# Patient Record
Sex: FEMALE
[De-identification: names, ages, dates, MRNs, and addresses within clinical notes are randomized per-mention and may not be internally consistent; named-entity substitution may affect disease eponyms.]

---

## 2019-04-09 ENCOUNTER — HOSPITAL ENCOUNTER (EMERGENCY)
Dept: HOSPITAL 25 - UCEAST | Age: 24
Discharge: HOME | End: 2019-04-09
Payer: COMMERCIAL

## 2019-04-09 VITALS — DIASTOLIC BLOOD PRESSURE: 81 MMHG | SYSTOLIC BLOOD PRESSURE: 141 MMHG

## 2019-04-09 DIAGNOSIS — R21: Primary | ICD-10-CM

## 2019-04-09 PROCEDURE — 99202 OFFICE O/P NEW SF 15 MIN: CPT

## 2019-04-09 PROCEDURE — G0463 HOSPITAL OUTPT CLINIC VISIT: HCPCS

## 2019-04-09 NOTE — UC
Skin Complaint HPI





- HPI Summary


HPI Summary: 


23-year-old woman comes in with a chief complaint of a rash.  The rash started 

4 days ago on her chest while she was wearing a life vest while kayaking in 

[place].  The rashes greatly spread to her upper shoulders her neck or arms or 

legs.  She was seen at [facility] 2 days ago and started on Benadryl.  The 

Benadryl does not seem to be helping much.  The rash is very itchy.  Patient 

feels well otherwise no sore throat no fevers.





- History of Current Complaint


Chief Complaint: UCSkin


Time Seen by Provider: 04/09/19 19:35


Stated Complaint: RASH


Hx Last Menstrual Period: 4/9/2019


Pain Intensity: 0





- Allergy/Home Medications


Allergies/Adverse Reactions: 


 Allergies











Allergy/AdvReac Type Severity Reaction Status Date / Time


 


No Known Allergies Allergy   Verified 04/09/19 19:48














PMH/Surg Hx/FS Hx/Imm Hx


Previously Healthy: Yes





- Surgical History


Surgical History: None





- Family History


Known Family History: Positive: Non-Contributory





- Social History


Alcohol Use: None


Substance Use Type: None


Smoking Status (MU): Never Smoked Tobacco





Review of Systems


All Other Systems Reviewed And Are Negative: Yes


Constitutional: Positive: Negative


Skin: Positive: Rash


Eyes: Positive: Negative


ENT: Positive: Negative


Respiratory: Positive: Negative


Cardiovascular: Positive: Negative


Gastrointestinal: Positive: Negative


Motor: Positive: Negative


Neurovascular: Positive: Negative


Musculoskeletal: Positive: Negative


Neurological: Positive: Negative


Psychological: Positive: Negative


Is Patient Immunocompromised?: No





Physical Exam


Triage Information Reviewed: Yes


Appearance: Well-Appearing, No Pain Distress, Well-Nourished


Vital Signs: 


 Initial Vital Signs











Temp  99.2 F   04/09/19 19:22


 


Pulse  111   04/09/19 19:22


 


Resp  18   04/09/19 19:22


 


BP  141/81   04/09/19 19:22


 


Pulse Ox  96   04/09/19 19:22











Vital Signs Reviewed: Yes


Eye Exam: Normal


Eyes: Positive: Conjunctiva Clear


ENT: Positive: Pharynx normal, TMs normal


Neck exam: Normal


Neck: Positive: Supple


Respiratory: Positive: Lungs clear, Normal breath sounds, No respiratory 

distress


Cardiovascular: Positive: RRR


Musculoskeletal Exam: Normal


Musculoskeletal: Positive: Strength Intact, ROM Intact


Neurological: Positive: Alert, Muscle Tone Normal


Psychological Exam: Normal


Psychological: Positive: Age Appropriate Behavior


Skin: Positive: Other - Diffuse erythematous rash on chest back upper arms.  

The rash is blanching.  Small patches approximately a centimeter in diameter 

was slightly raised areas.  No pustules.  No drainage.





Course/Dx





- Diagnoses


Provider Diagnosis: 


 Rash








Discharge





- Sign-Out/Discharge


Documenting (check all that apply): Patient Departure


All imaging exams completed and their final reports reviewed: No Studies





- Discharge Plan


Condition: Stable


Disposition: HOME


Prescriptions: 


predniSONE TAB* [Deltasone 20 MG TAB*] 20 mg PO BID PRN #8 tab


 PRN Reason: Rash


Patient Education Materials:  Acute Rash (ED)


Referrals: 


[facility] / [facility] [Provider Group]


Additional Instructions: 


FOLLOW UP WITH YOUR DOCTOR IF NOT COMPLETELY IMPROVED.


GET REEVALUATED SOONER FOR ANY WORSENING OF YOUR CONDITION OR ANY QUESTIONS OR 

CONCERNS.











- Billing Disposition and Condition


Condition: STABLE


Disposition: Home